# Patient Record
Sex: FEMALE | Race: WHITE | NOT HISPANIC OR LATINO | Employment: OTHER | URBAN - NONMETROPOLITAN AREA
[De-identification: names, ages, dates, MRNs, and addresses within clinical notes are randomized per-mention and may not be internally consistent; named-entity substitution may affect disease eponyms.]

---

## 2017-06-01 ENCOUNTER — OFFICE VISIT (OUTPATIENT)
Dept: ORTHOPEDIC SURGERY | Facility: CLINIC | Age: 72
End: 2017-06-01

## 2017-06-01 VITALS — BODY MASS INDEX: 25.91 KG/M2 | HEIGHT: 60 IN | WEIGHT: 132 LBS

## 2017-06-01 DIAGNOSIS — M65.311 TRIGGER FINGER OF RIGHT THUMB: Primary | ICD-10-CM

## 2017-06-01 DIAGNOSIS — R22.31 MASS OF HAND, RIGHT: ICD-10-CM

## 2017-06-01 DIAGNOSIS — M25.531 RIGHT WRIST PAIN: ICD-10-CM

## 2017-06-01 PROBLEM — R22.30 MASS OF HAND: Status: ACTIVE | Noted: 2017-06-01

## 2017-06-01 PROCEDURE — 99202 OFFICE O/P NEW SF 15 MIN: CPT | Performed by: ORTHOPAEDIC SURGERY

## 2017-06-01 PROCEDURE — 20600 DRAIN/INJ JOINT/BURSA W/O US: CPT | Performed by: ORTHOPAEDIC SURGERY

## 2017-06-01 RX ORDER — HYDROCODONE BITARTRATE AND ACETAMINOPHEN 10; 325 MG/1; MG/1
1 TABLET ORAL EVERY 6 HOURS PRN
COMMUNITY

## 2017-06-01 RX ORDER — AMLODIPINE BESYLATE 10 MG/1
10 TABLET ORAL DAILY
COMMUNITY

## 2017-06-01 RX ORDER — LOSARTAN POTASSIUM 25 MG/1
25 TABLET ORAL DAILY
COMMUNITY

## 2017-06-01 RX ORDER — CETIRIZINE HYDROCHLORIDE 10 MG/1
10 TABLET ORAL DAILY
COMMUNITY

## 2017-06-01 RX ORDER — BUPIVACAINE HYDROCHLORIDE 5 MG/ML
1 INJECTION, SOLUTION EPIDURAL; INTRACAUDAL
Status: COMPLETED | OUTPATIENT
Start: 2017-06-01 | End: 2017-06-01

## 2017-06-01 RX ORDER — BETAMETHASONE SODIUM PHOSPHATE AND BETAMETHASONE ACETATE 3; 3 MG/ML; MG/ML
6 INJECTION, SUSPENSION INTRA-ARTICULAR; INTRALESIONAL; INTRAMUSCULAR; SOFT TISSUE
Status: COMPLETED | OUTPATIENT
Start: 2017-06-01 | End: 2017-06-01

## 2017-06-01 RX ADMIN — BETAMETHASONE SODIUM PHOSPHATE AND BETAMETHASONE ACETATE 6 MG: 3; 3 INJECTION, SUSPENSION INTRA-ARTICULAR; INTRALESIONAL; INTRAMUSCULAR; SOFT TISSUE at 09:40

## 2017-06-01 RX ADMIN — BUPIVACAINE HYDROCHLORIDE 1 ML: 5 INJECTION, SOLUTION EPIDURAL; INTRACAUDAL at 09:40

## 2017-06-01 NOTE — PROGRESS NOTES
"  Patient: Kimberli Stapleton    YOB: 1945        History of Present Illness:   Patient is a 71-year-old right-hand dominant white female who presents for elevation of her right hand.  Complains of pain and catching in her thumb.  States she has a cyst or mass in her hand.  Also complains of pain in her wrist at times makes it hard to open jars do things like that.  No specific paresthesias.  No specific injury or trauma.  Denies any swelling of the hand    Former smoker.  Nondiabetic  Review of Systems:    Pertinent positives evaluated and listed in HPI, others systems completed by patient and reviewed today.         Physical Exam: 71 y.o. female  General Appearance:    Alert and oriented x 3, cooperative, in no acute distress                   Vitals:    06/01/17 0910   Weight: 132 lb (59.9 kg)   Height: 60\" (152.4 cm)          On exam patient is noted to have a painful nodule at the base on the volar aspect of her right thumb.  She has pain with flexion and extension of the thumb.  Might be just a slight triggering..  Patient is good range of motion of her wrist without obvious discomfort.  Mildly positive Finkelstein's.  There is no obvious swelling of the hand.  She is grossly neurovascular intact.  She is also noted to have a somewhat firm rubbery feeling mass at the base of her index finger in her palm.  Approximate centimeter and a half in width.      Radiology:       X-rays she brought in today were reviewed by myself show no significant acute findings.  Some month minor degenerative changes appropriate for age.    Small Joint Arthrocentesis  Consent given by: patient  Supporting Documentation  Indications: pain   Procedure Details  Location: thumb - Thumb joint: Right trigger thumb.  Preparation: Patient was prepped and draped in the usual sterile fashion  Needle size: 25 G  Approach: volar  Medications administered: 6 mg betamethasone acetate-betamethasone sodium phosphate 6 (3-3) MG/ML; 1 mL " bupivacaine (PF) 0.5 %  Patient tolerance: patient tolerated the procedure well with no immediate complications                Assessment/Plan:    Assessment right trigger thumb.  Today we'll try injected in about we'll splint for several days.  We'll check her back in approximately 3 weeks.  We discussed that this doesn't help possible surgical release.    #2 mass more right palm at the base of the index finger.  Feels like this might be inclusion cyst and consistency.  It does bother her when she squeezes things tight and we may talk about surgically removing this but would like to see how she responds to the trigger thumb injection first to start to see we would have to do both of these at once or not.      3 right wrist pain.  May have just still slightly positive Finkelstein test.  Might be some early basal joint arthroplasty arthritis.  She has been splinting this and this is actually the third of her problems shoulders continue to watch this for now.      Discussion/Summary:    This chart was completed utilizing the dragon speech recognition software.  Grammatical errors, random word insertions, pronoun errors, and incomplete sentences or occasional consequences of the system due to software limitations, ambient noise, and hardware issues.  Any questions or concerns about the content, text, or information contained within the body of this dictation should be directly addressed to the physician for clarification        This document was signed by Froylan Mata M.D. June 1, 2017 9:32 AM

## 2017-06-22 ENCOUNTER — OFFICE VISIT (OUTPATIENT)
Dept: ORTHOPEDIC SURGERY | Facility: CLINIC | Age: 72
End: 2017-06-22

## 2017-06-22 VITALS — BODY MASS INDEX: 25.91 KG/M2 | WEIGHT: 132 LBS | HEIGHT: 60 IN

## 2017-06-22 DIAGNOSIS — R22.31 MASS OF HAND, RIGHT: Primary | ICD-10-CM

## 2017-06-22 DIAGNOSIS — M65.311 TRIGGER FINGER OF RIGHT THUMB: ICD-10-CM

## 2017-06-22 PROCEDURE — 99213 OFFICE O/P EST LOW 20 MIN: CPT | Performed by: ORTHOPAEDIC SURGERY

## 2017-06-22 PROCEDURE — 20612 ASPIRATE/INJ GANGLION CYST: CPT | Performed by: ORTHOPAEDIC SURGERY

## 2017-06-22 NOTE — PROGRESS NOTES
Patient: Kimberli Stapleton  YOB: 1945  Date of Encounter: 06/22/2017        History of Present Illness:     71-year-old white female seen today secondary to cystic mass palmar aspect second digit as well as right trigger thumb. She was last seen on 6/1/17. Patient reports that the previous injection at the A1 pulley of the thumb has alleviated her pain and symptoms. She has no pain with this with no significant catching or locking. She reports that the pain in her wrist along the first dorsal compartment has subsided as well. She continues to complain of the cystic mass with no changes. Denies paresthesias    Physical Exam: 71 y.o. female .  General Appearance:    Well appearing, cooperative, in no acute distress.       Patient is alert and oriented x 3.          Musculoskeletal: On examination the patient has a firm freely movable mass at the base of her index finger in her palm there is no significant surrounding erythema or ecchymosis. This is an approximate 6 cm in width. Examination the patient's thumb and wrist reveals she has no pain with flexion or extension of the thumb. Full range of motion of her wrist with Finkelstein test negative.      Cystic aspiration right second digit palmar surface  Date/Time: 6/22/2017 10:28 AM  Performed by: CHAPO APPLE  Authorized by: CHAPO APPLE   Consent: Verbal consent obtained.  Consent given by: patient  Type: cyst  Location: Palmar aspect the level of the MCP second digit right hand.  Needle gauge: 18  Drainage characteristics: Clear jellylike aspirate.  Patient tolerance: Patient tolerated the procedure well with no immediate complications  Comments: Pressure dressing applied.          Assessment:    ICD-10-CM ICD-9-CM   1. Mass of hand, right R22.31 782.2   2. Trigger finger of right thumb M65.311 727.03       Plan:    Patient tolerated the aspiration of the simple ganglion cyst well. She was informed that there is likely  progression and recurrence of this. Depending on her response we will continue to monitor her conservatively. She was also informed that the trigger thumb injection has a chance of recurrence as well. We will see the patient back upon any further worsening or return of her pain or symptoms. In the event that this is the case we will discuss surgical excision of the cystic mass as well as trigger thumb release. Follow-up as needed.        Discussion and Summary:    Written by Jay Frost PA-C, acting as scribe for Dr. Mata        This document was signed by Jay Frost PA-C June 22, 2017

## 2020-02-14 ENCOUNTER — APPOINTMENT (OUTPATIENT)
Dept: MAMMOGRAPHY | Facility: HOSPITAL | Age: 75
End: 2020-02-14

## 2021-03-26 ENCOUNTER — APPOINTMENT (OUTPATIENT)
Dept: MAMMOGRAPHY | Facility: HOSPITAL | Age: 76
End: 2021-03-26

## 2022-07-20 ENCOUNTER — APPOINTMENT (OUTPATIENT)
Dept: GENERAL RADIOLOGY | Facility: HOSPITAL | Age: 77
End: 2022-07-20

## 2022-07-20 ENCOUNTER — HOSPITAL ENCOUNTER (EMERGENCY)
Facility: HOSPITAL | Age: 77
Discharge: HOME | End: 2022-07-20
Attending: EMERGENCY MEDICINE | Admitting: EMERGENCY MEDICINE

## 2022-07-20 VITALS
SYSTOLIC BLOOD PRESSURE: 155 MMHG | OXYGEN SATURATION: 99 % | WEIGHT: 125 LBS | DIASTOLIC BLOOD PRESSURE: 74 MMHG | BODY MASS INDEX: 25.2 KG/M2 | HEIGHT: 59 IN | RESPIRATION RATE: 18 BRPM | HEART RATE: 95 BPM | TEMPERATURE: 97.8 F

## 2022-07-20 DIAGNOSIS — S82.842A CLOSED BIMALLEOLAR FRACTURE OF LEFT ANKLE, INITIAL ENCOUNTER: Primary | ICD-10-CM

## 2022-07-20 PROCEDURE — 73630 X-RAY EXAM OF FOOT: CPT

## 2022-07-20 PROCEDURE — 73610 X-RAY EXAM OF ANKLE: CPT | Performed by: RADIOLOGY

## 2022-07-20 PROCEDURE — 73590 X-RAY EXAM OF LOWER LEG: CPT

## 2022-07-20 PROCEDURE — 73610 X-RAY EXAM OF ANKLE: CPT

## 2022-07-20 PROCEDURE — 25010000002 PROPOFOL 10 MG/ML EMULSION: Performed by: EMERGENCY MEDICINE

## 2022-07-20 PROCEDURE — 73590 X-RAY EXAM OF LOWER LEG: CPT | Performed by: RADIOLOGY

## 2022-07-20 PROCEDURE — 99283 EMERGENCY DEPT VISIT LOW MDM: CPT

## 2022-07-20 PROCEDURE — 73630 X-RAY EXAM OF FOOT: CPT | Performed by: RADIOLOGY

## 2022-07-20 RX ORDER — PROPOFOL 10 MG/ML
100 VIAL (ML) INTRAVENOUS ONCE
Status: COMPLETED | OUTPATIENT
Start: 2022-07-20 | End: 2022-07-20

## 2022-07-20 RX ORDER — HYDROCODONE BITARTRATE AND ACETAMINOPHEN 7.5; 325 MG/1; MG/1
1 TABLET ORAL EVERY 6 HOURS PRN
Qty: 12 TABLET | Refills: 0 | Status: SHIPPED | OUTPATIENT
Start: 2022-07-20

## 2022-07-20 RX ORDER — SODIUM CHLORIDE 0.9 % (FLUSH) 0.9 %
10 SYRINGE (ML) INJECTION AS NEEDED
Status: DISCONTINUED | OUTPATIENT
Start: 2022-07-20 | End: 2022-07-20 | Stop reason: HOSPADM

## 2022-07-20 RX ADMIN — PROPOFOL 100 MG: 10 INJECTION, EMULSION INTRAVENOUS at 15:46

## 2022-07-20 NOTE — ED PROVIDER NOTES
Subjective     History provided by:  Patient   used: No    Ankle Injury  Location:  Left ankle  Severity:  Mild  Onset quality:  Sudden  Timing:  Constant  Progression:  Worsening  Chronicity:  New  Context:  Pt states she was push mowing and slipped on wet grass.   Relieved by:  Norco   Worsened by:  Movement, bearing weight.   Associated symptoms: no abdominal pain, no chest pain, no congestion, no cough, no diarrhea, no ear pain, no fatigue, no fever, no headaches, no loss of consciousness, no myalgias, no nausea, no rash, no rhinorrhea, no shortness of breath, no sore throat, no vomiting and no wheezing        Review of Systems   Constitutional: Negative.  Negative for fatigue and fever.   HENT: Negative.  Negative for congestion, ear pain, rhinorrhea and sore throat.    Eyes: Negative.    Respiratory: Negative.  Negative for cough, shortness of breath and wheezing.    Cardiovascular: Negative.  Negative for chest pain.   Gastrointestinal: Negative.  Negative for abdominal pain, diarrhea, nausea and vomiting.   Endocrine: Negative.    Genitourinary: Negative.    Musculoskeletal: Negative for myalgias.        Left ankle pain    Skin: Negative.  Negative for rash.   Allergic/Immunologic: Negative.    Neurological: Negative.  Negative for loss of consciousness and headaches.   Hematological: Negative.    Psychiatric/Behavioral: Negative.    All other systems reviewed and are negative.      Past Medical History:   Diagnosis Date   • Cancer (HCC)     skin   • High cholesterol    • Hypertension    • Lower back pain        No Known Allergies    Past Surgical History:   Procedure Laterality Date   • APPENDECTOMY         Family History   Problem Relation Age of Onset   • Hypertension Mother        Social History     Socioeconomic History   • Marital status:    Tobacco Use   • Smoking status: Former Smoker   Substance and Sexual Activity   • Alcohol use: No           Objective   Physical  Exam  Vitals and nursing note reviewed.   Constitutional:       Appearance: Normal appearance. She is normal weight.   HENT:      Head: Normocephalic and atraumatic.      Right Ear: External ear normal.      Left Ear: External ear normal.      Nose: Nose normal.      Mouth/Throat:      Mouth: Mucous membranes are moist.      Pharynx: Oropharynx is clear.   Eyes:      Extraocular Movements: Extraocular movements intact.      Conjunctiva/sclera: Conjunctivae normal.      Pupils: Pupils are equal, round, and reactive to light.   Cardiovascular:      Rate and Rhythm: Normal rate and regular rhythm.      Pulses: Normal pulses.      Heart sounds: Normal heart sounds.   Pulmonary:      Effort: Pulmonary effort is normal.      Breath sounds: Normal breath sounds.   Abdominal:      General: Abdomen is flat. Bowel sounds are normal.      Palpations: Abdomen is soft.   Musculoskeletal:      Cervical back: Normal range of motion and neck supple.      Right ankle: Normal.      Left ankle: Swelling and ecchymosis present. Tenderness present. Decreased range of motion. Normal pulse.      Right foot: Normal.      Left foot: Decreased range of motion. Swelling, tenderness and bony tenderness present. Normal pulse.        Legs:    Skin:     General: Skin is warm and dry.      Capillary Refill: Capillary refill takes less than 2 seconds.   Neurological:      General: No focal deficit present.      Mental Status: She is alert and oriented to person, place, and time. Mental status is at baseline.   Psychiatric:         Mood and Affect: Mood normal.         Behavior: Behavior normal.         Thought Content: Thought content normal.         Judgment: Judgment normal.         Splint - Cast - Strapping    Date/Time: 7/20/2022 3:49 PM  Performed by: Krissy Eller PA  Authorized by: Ludin Moser MD     Consent:     Consent obtained:  Verbal    Consent given by:  Patient    Risks, benefits, and alternatives were discussed: yes       Risks discussed:  Discoloration, numbness, pain and swelling    Alternatives discussed:  Referral, observation, alternative treatment, delayed treatment and no treatment  Universal protocol:     Procedure explained and questions answered to patient or proxy's satisfaction: yes      Relevant documents present and verified: yes      Test results available: yes      Imaging studies available: yes      Required blood products, implants, devices, and special equipment available: yes      Site/side marked: yes      Immediately prior to procedure a time out was called: yes      Patient identity confirmed:  Verbally with patient, arm band, provided demographic data and hospital-assigned identification number  Pre-procedure details:     Distal neurologic exam:  Normal    Distal perfusion: distal pulses strong    Procedure details:     Location:  Ankle    Splint type:  Short leg    Supplies:  Cotton padding, fiberglass and elastic bandage  Post-procedure details:     Distal neurologic exam:  Normal    Distal perfusion: distal pulses strong      Procedure completion:  Tolerated well, no immediate complications               ED Course  ED Course as of 07/20/22 1651   Wed Jul 20, 2022   1501 XR Foot 3+ View Left  IMPRESSION:  1.  Fractures of the distal tibia and distal fibula.  2.  No acute fracture of the foot proper.     This report was finalized on 7/20/2022 2:57 PM by Dr. Petros Patrick MD.             Specimen Collected: 07/20/22 14:56 Last Resulted: 07/20/22 14:57           [ML]   1501 XR Tibia Fibula 2 View Left     IMPRESSION:  1.  Fractures of the distal tibia and distal fibula.  2.  Disruption of the tibiotalar joint.     This report was finalized on 7/20/2022 2:56 PM by Dr. Petros Patrick MD.             Specimen Collected: 07/20/22 14:56 Last Resulted: 07/20/22 14:56         [ML]   1501 XR Ankle 3+ View Left     IMPRESSION:  1.  Fracture of the medial malleolus.  2.  Oblique fracture of the distal fibula.  3.   Tibiotalar disruption with joint space widening and lateral  displacement of the talus in relation to the distal tibia.     This report was finalized on 7/20/2022 2:56 PM by Dr. Petros Patrick MD.             Specimen Collected: 07/20/22 14:55 Last Resulted: 07/20/22 14:56         [ML]   1509 D/W Dr. Head. He recommends closed reduction and he will see in office.   [ML]   1539 Patient sedated with 100mg propofol IV, sedation obtained, traction applied to foot , then splinted [PIERO]   1548 Dr. Moser at bedside for reduction.  [ML]   1626 XR Ankle 3+ View Left  IMPRESSION:    External cast with improved alignment but still significant disruption  of the tibiotalar joint.     This report was finalized on 7/20/2022 4:13 PM by Dr. Petros Patrick MD.             Specimen Collected: 07/20/22 16:12 Last Resulted: 07/20/22 16:13           [ML]      ED Course User Index  [PIERO] Ludin Moser MD  [ML] Krissy Eller PA                                           MDM  Number of Diagnoses or Management Options     Amount and/or Complexity of Data Reviewed  Tests in the radiology section of CPT®: ordered and reviewed  Discuss the patient with other providers: yes    Risk of Complications, Morbidity, and/or Mortality  Presenting problems: low  Diagnostic procedures: low  Management options: low    Patient Progress  Patient progress: improved      Final diagnoses:   Closed bimalleolar fracture of left ankle, initial encounter       ED Disposition  ED Disposition     ED Disposition   Discharge    Condition   Stable    Comment   --             Juan Aragon MD  [address] [facility] [id number] [phone]    Schedule an appointment as soon as possible for a visit in 1 day      Jay Head DO  [address] [facility] [id number] [phone]    Schedule an appointment as soon as possible for a visit in 1 day           Medication List      Changed    * HYDROcodone-acetaminophen  MG per tablet  Commonly  known as: NORCO  What changed: Another medication with the same name was added. Make sure you understand how and when to take each.     * HYDROcodone-acetaminophen 7.5-325 MG per tablet  Commonly known as: NORCO  Take 1 tablet by mouth Every 6 (Six) Hours As Needed for Moderate Pain .  What changed: You were already taking a medication with the same name, and this prescription was added. Make sure you understand how and when to take each.         * This list has 2 medication(s) that are the same as other medications prescribed for you. Read the directions carefully, and ask your doctor or other care provider to review them with you.               Where to Get Your Medications      These medications were sent to [place], [place] - [address] [facility] [phone] [facility] [phone]   [address] [facility] [address]    Phone: [phone]   · HYDROcodone-acetaminophen 7.5-325 MG per tablet          Krissy Eller PA  07/20/22 3910

## 2022-07-20 NOTE — ED NOTES
MEDICAL SCREENING:    Reason for Visit: Left ankle injury    Patient initially seen in triage.  The patient was advised further evaluation and diagnostic testing will be needed, some of the treatment and testing will be initiated in the lobby in order to begin the process.  The patient will be returned to the waiting area for the time being and possibly be re-assessed by a subsequent ED provider.  The patient will be brought back to the treatment area in as timely manner as possible.         Froylan Jensen PA  07/20/22 1344

## 2022-08-03 ENCOUNTER — TRANSCRIBE ORDERS (OUTPATIENT)
Dept: ONCOLOGY | Facility: HOSPITAL | Age: 77
End: 2022-08-03

## 2022-08-05 ENCOUNTER — OFFICE VISIT (OUTPATIENT)
Dept: ONCOLOGY | Facility: HOSPITAL | Age: 77
End: 2022-08-05

## 2022-08-05 VITALS
TEMPERATURE: 98.2 F | OXYGEN SATURATION: 95 % | SYSTOLIC BLOOD PRESSURE: 118 MMHG | DIASTOLIC BLOOD PRESSURE: 60 MMHG | HEART RATE: 89 BPM | RESPIRATION RATE: 18 BRPM

## 2022-08-05 DIAGNOSIS — Z87.81 S/P ORIF (OPEN REDUCTION INTERNAL FIXATION) FRACTURE: Primary | ICD-10-CM

## 2022-08-05 DIAGNOSIS — Z98.890 S/P ORIF (OPEN REDUCTION INTERNAL FIXATION) FRACTURE: Primary | ICD-10-CM

## 2022-08-05 PROCEDURE — G0463 HOSPITAL OUTPT CLINIC VISIT: HCPCS

## 2022-08-09 ENCOUNTER — OFFICE VISIT (OUTPATIENT)
Dept: ONCOLOGY | Facility: HOSPITAL | Age: 77
End: 2022-08-09

## 2022-08-09 VITALS
OXYGEN SATURATION: 97 % | HEART RATE: 90 BPM | SYSTOLIC BLOOD PRESSURE: 114 MMHG | TEMPERATURE: 98.2 F | RESPIRATION RATE: 18 BRPM | DIASTOLIC BLOOD PRESSURE: 66 MMHG

## 2022-08-09 DIAGNOSIS — Z87.81 S/P ORIF (OPEN REDUCTION INTERNAL FIXATION) FRACTURE: Primary | ICD-10-CM

## 2022-08-09 DIAGNOSIS — Z98.890 S/P ORIF (OPEN REDUCTION INTERNAL FIXATION) FRACTURE: Primary | ICD-10-CM

## 2022-08-09 PROCEDURE — G0463 HOSPITAL OUTPT CLINIC VISIT: HCPCS

## 2022-08-12 ENCOUNTER — OFFICE VISIT (OUTPATIENT)
Dept: ONCOLOGY | Facility: HOSPITAL | Age: 77
End: 2022-08-12

## 2022-08-12 VITALS
RESPIRATION RATE: 18 BRPM | TEMPERATURE: 97.7 F | HEART RATE: 94 BPM | SYSTOLIC BLOOD PRESSURE: 108 MMHG | DIASTOLIC BLOOD PRESSURE: 59 MMHG | OXYGEN SATURATION: 95 %

## 2022-08-12 DIAGNOSIS — Z98.890 HISTORY OF ANKLE SURGERY: Primary | ICD-10-CM

## 2022-08-12 PROCEDURE — G0463 HOSPITAL OUTPT CLINIC VISIT: HCPCS

## 2022-08-19 ENCOUNTER — OFFICE VISIT (OUTPATIENT)
Dept: ONCOLOGY | Facility: HOSPITAL | Age: 77
End: 2022-08-19

## 2022-08-19 VITALS
HEART RATE: 83 BPM | RESPIRATION RATE: 18 BRPM | SYSTOLIC BLOOD PRESSURE: 111 MMHG | DIASTOLIC BLOOD PRESSURE: 59 MMHG | TEMPERATURE: 97.1 F | OXYGEN SATURATION: 97 %

## 2022-08-19 DIAGNOSIS — Z98.890 HISTORY OF ANKLE SURGERY: Primary | ICD-10-CM

## 2022-08-19 PROCEDURE — G0463 HOSPITAL OUTPT CLINIC VISIT: HCPCS

## 2022-08-23 ENCOUNTER — APPOINTMENT (OUTPATIENT)
Dept: ONCOLOGY | Facility: HOSPITAL | Age: 77
End: 2022-08-23

## 2022-08-26 ENCOUNTER — OFFICE VISIT (OUTPATIENT)
Dept: ONCOLOGY | Facility: HOSPITAL | Age: 77
End: 2022-08-26

## 2022-08-26 VITALS
DIASTOLIC BLOOD PRESSURE: 50 MMHG | TEMPERATURE: 96.9 F | RESPIRATION RATE: 18 BRPM | SYSTOLIC BLOOD PRESSURE: 131 MMHG | HEART RATE: 85 BPM | OXYGEN SATURATION: 96 %

## 2022-08-26 DIAGNOSIS — S99.919A ANKLE INJURY, UNSPECIFIED LATERALITY, INITIAL ENCOUNTER: Primary | ICD-10-CM

## 2022-08-26 PROCEDURE — G0463 HOSPITAL OUTPT CLINIC VISIT: HCPCS

## 2022-08-30 ENCOUNTER — OFFICE VISIT (OUTPATIENT)
Dept: ONCOLOGY | Facility: HOSPITAL | Age: 77
End: 2022-08-30

## 2022-08-30 VITALS
SYSTOLIC BLOOD PRESSURE: 133 MMHG | HEART RATE: 90 BPM | OXYGEN SATURATION: 97 % | DIASTOLIC BLOOD PRESSURE: 59 MMHG | RESPIRATION RATE: 18 BRPM | TEMPERATURE: 97.5 F

## 2022-08-30 DIAGNOSIS — Z98.890 HISTORY OF ANKLE SURGERY: Primary | ICD-10-CM

## 2022-08-30 PROCEDURE — G0463 HOSPITAL OUTPT CLINIC VISIT: HCPCS

## 2022-09-02 ENCOUNTER — OFFICE VISIT (OUTPATIENT)
Dept: ONCOLOGY | Facility: HOSPITAL | Age: 77
End: 2022-09-02

## 2022-09-02 VITALS
TEMPERATURE: 98 F | DIASTOLIC BLOOD PRESSURE: 55 MMHG | OXYGEN SATURATION: 97 % | SYSTOLIC BLOOD PRESSURE: 126 MMHG | HEART RATE: 100 BPM | RESPIRATION RATE: 18 BRPM

## 2022-09-02 DIAGNOSIS — Z98.890 HISTORY OF ANKLE SURGERY: Primary | ICD-10-CM

## 2022-09-02 PROCEDURE — G0463 HOSPITAL OUTPT CLINIC VISIT: HCPCS

## 2023-05-24 ENCOUNTER — TRANSCRIBE ORDERS (OUTPATIENT)
Dept: ADMINISTRATIVE | Facility: HOSPITAL | Age: 78
End: 2023-05-24
Payer: MEDICARE

## 2023-05-24 DIAGNOSIS — M51.36 DDD (DEGENERATIVE DISC DISEASE), LUMBAR: Primary | ICD-10-CM

## 2025-04-08 ENCOUNTER — TRANSCRIBE ORDERS (OUTPATIENT)
Dept: ADMINISTRATIVE | Facility: HOSPITAL | Age: 80
End: 2025-04-08
Payer: MEDICARE

## 2025-04-08 DIAGNOSIS — J32.9 RECURRENT SINUSITIS: Primary | ICD-10-CM

## 2025-04-28 ENCOUNTER — HOSPITAL ENCOUNTER (OUTPATIENT)
Dept: CT IMAGING | Facility: HOSPITAL | Age: 80
Discharge: HOME | End: 2025-04-28
Admitting: FAMILY MEDICINE
Payer: MEDICARE

## 2025-04-28 DIAGNOSIS — J32.9 RECURRENT SINUSITIS: ICD-10-CM

## 2025-04-28 PROCEDURE — 70486 CT MAXILLOFACIAL W/O DYE: CPT | Performed by: RADIOLOGY

## 2025-04-28 PROCEDURE — 70486 CT MAXILLOFACIAL W/O DYE: CPT
